# Patient Record
Sex: FEMALE | Race: WHITE | NOT HISPANIC OR LATINO | Employment: STUDENT | URBAN - METROPOLITAN AREA
[De-identification: names, ages, dates, MRNs, and addresses within clinical notes are randomized per-mention and may not be internally consistent; named-entity substitution may affect disease eponyms.]

---
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
## 2024-05-12 NOTE — PROGRESS NOTES
"OBSTETRICS AND GYNECOLOGY    Chief Complaint:  Well Woman Exam     HPI:      Ophelia Reese is a 18 y.o.  who presents today for well woman exam.    LMP: No LMP recorded (lmp unknown). Specifically, patient denies abnormal vaginal bleeding, abnormal discharge/odor, pelvic pain, or dysuria/hematuria. Discontinued OCPs last fall. Walker did not need. Menses usually once a month.   This menses has been late this month. No dysmenorrhea. No menorrhagia. Ms. Reese is not currently sexually active. She declines STD screening today. She denies additional issues, problems, or complaints.     Gardasil: Completed   Ms. Reese confirms that she wears her seatbelt when riding in the car.     OB History          0    Para   0    Term   0       0    AB   0    Living   0         SAB   0    IAB   0    Ectopic   0    Multiple   0    Live Births   0               ROS:     GENERAL: Feeling well overall.   BREASTS: Denies breast skin changes, lumps or nipple discharge.   URINARY: Denies dysuria, hematuria.    Physical Exam:      PHYSICAL EXAM:  /64   Ht 5' 5" (1.651 m)   Wt 55.7 kg (122 lb 12.7 oz)   LMP  (LMP Unknown)   BMI 20.43 kg/m²   Body mass index is 20.43 kg/m².     APPEARANCE:  Well nourished, well developed, in no acute distress.  Able to smile appropriately during our encounter. Makes eye contact. Pleasant.  PSYCH: Appropriate mood and affect.  SKIN:   No acne or hirsutism.  CARDIOVASCULAR:  No edema of peripheral extremities. Well perfused throughout.  RESP:  No accessory muscle use to breathe. Speaking comfortably in complete sentences.   ABDOMEN:  Soft. Nonacute.      Assessment/Plan:     Well Woman Exam  -- Counseled patient regarding healthy diet and regular exercise, daily seat belt use.   -- BP normotensive  -- She denies abuse and feels safe at home.   -- Pap smear:   rec initiate age 20  -- STD screening:  declines   -- UPT  -- Contraception:  declines today  Aware of " pregnancy possibility if to become sexually active  -- To let us know if irregular menses pattern  -- Reviewed condoms for STD prevention  -- Patient of Dr. Jacobo     Follow up in about 1 year (around 5/30/2025) for WWE.    Counseling:     Patient was counseled today on current ASCCP pap guidelines, the recommendation for yearly physical exams, safe driving habits, and breast self awareness. She is to see her PCP for other health maintenance.     Use of the [facility] Patient Portal discussed and encouraged during today's visit.                 As of April 1, 2021, the Cures Act has been passed nationally. This new law requires that all doctors progress notes, lab results, pathology reports and radiology reports be released IMMEDIATELY to the patient in the patient portal. That means that the results are released to you at the EXACT same time they are released to me. Therefore, with all of the patients that I have I am not able to reply to each patient exactly when the results come in. So there will be a delay from when you see the results to when I see them and have time to come up with a response to send you. Also I only see these results when I am on the computer at work. So if the results come in over the weekend or after 5 pm of a work day, I will not see them until the next business day. As you can tell, this is a challenge as a physician to give every patient the quick response they hope for and deserve. So please be patient!   Thanks for your understanding and patience.

## 2024-05-30 ENCOUNTER — OFFICE VISIT (OUTPATIENT)
Dept: OBSTETRICS AND GYNECOLOGY | Facility: CLINIC | Age: 19
End: 2024-05-30
Payer: COMMERCIAL

## 2024-05-30 VITALS
BODY MASS INDEX: 20.46 KG/M2 | WEIGHT: 122.81 LBS | DIASTOLIC BLOOD PRESSURE: 64 MMHG | SYSTOLIC BLOOD PRESSURE: 119 MMHG | HEIGHT: 65 IN

## 2024-05-30 DIAGNOSIS — Z01.419 ENCOUNTER FOR WELL WOMAN EXAM: Primary | ICD-10-CM

## 2024-05-30 PROCEDURE — 3074F SYST BP LT 130 MM HG: CPT | Mod: CPTII,S$GLB,, | Performed by: STUDENT IN AN ORGANIZED HEALTH CARE EDUCATION/TRAINING PROGRAM

## 2024-05-30 PROCEDURE — 3008F BODY MASS INDEX DOCD: CPT | Mod: CPTII,S$GLB,, | Performed by: STUDENT IN AN ORGANIZED HEALTH CARE EDUCATION/TRAINING PROGRAM

## 2024-05-30 PROCEDURE — 3078F DIAST BP <80 MM HG: CPT | Mod: CPTII,S$GLB,, | Performed by: STUDENT IN AN ORGANIZED HEALTH CARE EDUCATION/TRAINING PROGRAM

## 2024-05-30 PROCEDURE — 1159F MED LIST DOCD IN RCRD: CPT | Mod: CPTII,S$GLB,, | Performed by: STUDENT IN AN ORGANIZED HEALTH CARE EDUCATION/TRAINING PROGRAM

## 2024-05-30 PROCEDURE — 99395 PREV VISIT EST AGE 18-39: CPT | Mod: S$GLB,,, | Performed by: STUDENT IN AN ORGANIZED HEALTH CARE EDUCATION/TRAINING PROGRAM

## 2024-05-30 PROCEDURE — 99999 PR PBB SHADOW E&M-EST. PATIENT-LVL III: CPT | Mod: PBBFAC,,, | Performed by: STUDENT IN AN ORGANIZED HEALTH CARE EDUCATION/TRAINING PROGRAM

## 2025-02-07 DIAGNOSIS — Z71.3 NUTRITIONAL COUNSELING: Primary | ICD-10-CM

## 2025-03-25 ENCOUNTER — NUTRITION (OUTPATIENT)
Dept: NUTRITION | Facility: CLINIC | Age: 20
End: 2025-03-25
Payer: COMMERCIAL

## 2025-03-25 VITALS — WEIGHT: 123 LBS | BODY MASS INDEX: 20.49 KG/M2 | HEIGHT: 65 IN

## 2025-03-25 DIAGNOSIS — Z71.3 NUTRITIONAL COUNSELING: ICD-10-CM

## 2025-03-25 PROCEDURE — 99999 PR PBB SHADOW E&M-EST. PATIENT-LVL II: CPT | Mod: PBBFAC,,, | Performed by: DIETITIAN, REGISTERED

## 2025-03-25 PROCEDURE — 97802 MEDICAL NUTRITION INDIV IN: CPT | Mod: S$GLB,,, | Performed by: DIETITIAN, REGISTERED

## 2025-03-25 NOTE — PROGRESS NOTES
"Nutrition Assessment    Visit Type: Insurance initial  Session Time:  1 Hour 30 Minutes  Reason for MNT visit: Pt in for education and nutrition counseling regarding  GI issues.  .       Age: 19 y.o.  Wt:   Wt Readings from Last 1 Encounters:   03/25/25 55.8 kg (123 lb)     Ht:   Ht Readings from Last 1 Encounters:   03/25/25 5' 5" (1.651 m)     BMI:   BMI Readings from Last 1 Encounters:   03/25/25 20.47 kg/m²       Client states:    Ophelia has had issues with GI for a while; gas cramps, indigestion, had bad constipation and started putting castor oil in her belly button and it has helped a lot. She does this daily and it helps with no negative side effects. Ophelia says that gas pain / discomfort are still continuing. She went to a GI Dr, they put her on linzess, which was not helpful and had uncomfortable side effects. Ophelia has had issues with GI since she was a child, pain was so bad once in high school that she fainted.She has some nausea at times, no vomiting, no diarrhea. She limits dairy over all. 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     Medical History      Past Medical History:   Diagnosis Date    Allergy     Eczema        Past Surgical History:   Procedure Laterality Date    COLONOSCOPY N/A 3/3/2023    Procedure: COLONOSCOPY;  Surgeon: Tiffanie Carrasco MD;  Location: [facility] ([address] [place] [address]);  Service: Endoscopy;  Laterality: N/A;  1st available per pt-inst portal-suprep-tb    ESOPHAGOGASTRODUODENOSCOPY N/A 3/3/2023    Procedure: EGD (ESOPHAGOGASTRODUODENOSCOPY);  Surgeon: Tiffanie Carrasco MD;  Location: [address]);  Service: Endoscopy;  Laterality: N/A;  2-27-23- preop call - voice mail on- no message left- [facility]          Medications    Prior to Admission medications    Medication Sig Start Date End Date Taking? Authorizing Provider   dapsone (ACZONE) 7.5 % GlwP  7/19/23   Provider, Historical   tretinoin (RETIN-A) 0.1 % cream  7/19/23   Provider, Historical        Vitamins, Minerals, and/or Supplements:  " ashwaganda  (not daily)    Food Allergies or Intolerances:   some issues with dairy; GI discomfort and skin reaction have occurred     Social History    Marital status:  Single  Lives with 3 other roommates, sharing kitchen (at school)    Social History     Tobacco Use    Smoking status: Never    Smokeless tobacco: Never   Substance Use Topics    Alcohol use: Not Currently     Current Alcohol use: on weekends, 6 drinks approx Friday-sunday    Lab Reports:  Reviewed and noted     Lab Results   Component Value Date    CRP 4.7 12/06/2022         BP Readings from Last 1 Encounters:   05/30/24 119/64        24-hour Recall:   9 am: wake up, nauseas  Work out 9:30 a - 10:00 pm    11 am : Matcha + almond milk  B: 2 eggs, 1 english muffin (not daily), usually no food  *no drinking Matcha on empty stomach: oatmeal + protein powder OR yogurt (non dairy) + nuts, fruit OR protein/fiber rich toast w/ nut butter + jelly + fruit    12- 1 pm: turkey burger (onion, pickle, ground turkey, ketchup, brioche bun) or ground turkey pasta + broccoli / steak + rice w/ vegetables, sweet potatoes )     3 pm: Matcha + almond milk  *snack: add protein powder to Matcha drink  / trail mix / fresh fruit + PB, socorro crackers - don't drink Matcha on empty stomach    6-7 pm Dinner: steak (x2 week) or ground turkey pasta / occasional chicken nuggets / sweet potato/ pasta / rice / zuchinni/squash/cucumbers/carrots/brussel sprouts/cauliflower / likes tuna, salmon / maybe black beans & corn if taco bowl meal  Salad: caesar with chicken or steak (dairy free dressing), avocado   Fruit: grapes, strawberries, apples    Typical week: steak 2 x/ and ground turkey 2x /day for 3 days, 2 days ground turkey 1x (when she has steak the other meal)  *swap out 1 steak meal and 2 turkey meals for meat-less meals 3 x/ week: increase beans, vegetables, fiber + protein rich grains  *assess portion size of turkey and steak you are consuming, aim for 4-5 oz per meal    8-9  pm Snack: wheat square cereal w/ almond milk / PB o apple / cashews salted (not daily)  *try small portion of cottage cheese + berries OR coconut or soy-based yogurt    Beverages: water, liquid IV occasionally, green tea, night time tea, occasional gatorade    Bed around 10 pm    *shops at [employer] (like whole foods) +  [employer]    Food choices (After Midnight)  Patient eating midnight to 4am: (Patient-Rptd) (P) Never                  Food choices (Early Morning)  Patient eats 4am to 8am: (Patient-Rptd) (P) Never                  Food choices (Morning)  Patient eats 8am to noon: (Patient-Rptd) (P) Never                 Food choices (Afternoon)  Patient eats noon to 4pm: (Patient-Rptd) (P) Every day   Home cooked meals (Noon - 4pm): (Patient-Rptd) (P) Turkey with rice, chicken with rice         Food choices (Evening)   Patient eats 4pm to 8pm: (Patient-Rptd) (P) Every day   Home cooked meals (4pm - 8pm): (Patient-Rptd) (P) Turkey burger or steak             Food choices (Late evening)  R [facility] PEQ NUTRITION HOW OFTEN EATING LATE EVENING: (Patient-Rptd) (P) Several times a week   Home cooked meals (8pm - midnight): (Patient-Rptd) (P) Same              Beverages:  How much water consumed (per day?): (Patient-Rptd) (P) 10   Cups of milk consumed (per day?): (Patient-Rptd) (P) 0       Cups of  juice consumed (per day?): (Patient-Rptd) (P) 0   Number of supplement shakes (per day?): (Patient-Rptd) (P) 0       Number of cups of coffee (per day?): (Patient-Rptd) (P) 0           Cups of soda consumed (per day?): (Patient-Rptd) (P) 0   Other non-alcoholic beverages consumed (per day?): (Patient-Rptd) (P) 0          LIFESTYLE FACTORS    Dinning out: 1-2 x per month    Meal preparation/shopping: Who does the grocery shopping:: (Patient-Rptd) (P) Me Who prepares the meals: (Patient-Rptd) (P) Me Client    Sleep: fair    Energy Level: some days very fatigued, other days more energy    Stress Level: low    Support System:   parents    Exercise Regimen: Moderately active (moderate intensity, 3-5 days a week)  [employer] classes 15 min (1-2 x) , 3 x /week  Walk 4 mile 2 x/ week    Diagnosis    Food and nutrition related knowledge deficit related to no prior education on balanced nutrition intake recommendations for GI health as evidenced by diet recall, discussion.      Intervention    Estimated Energy Requirements:   Calories: 1700 kcal   Protein: 100-120 grams  Carbohydrates: 130 - 150 grams  Total Fat: 75-85 grams  Baseline for fluids: 60 fl oz    Recommendations & Goals:  Patient goals and recommendations are tailored to the specific patient's needs, readiness to change, lifestyle, culture, skills, resources, & abilities. Strategies to help achieve these nutrition-related goals were discussed which can include but are not limited to SMART goal setting & mindful eating.     Aim for a minimum of 7 hours sleep   Exercise 60 minutes most days  Eat breakfast within 1-2 hours of waking up  Try not to skip any meals or snacks, not going more than 3-4 hours without eating   At each meal and snack, try to include a source of fiber + lean protein + healthy fat     Written Materials Provided  These resources are intended to assist the patient in making it easier to choose recommended options when eating out & to identify better-for-you brands at the grocery store:    Meal Planning Guide with recommendations discussed along with portion sizes and a customized meal plan   Fueling Well On-the-Go Food Guide  Eat Fit Shopping Guide  Lifestyle Nutrition Meal Guide  RD contact information    Goals:   - fuel body more often, every 3-4 hours with combination of whole grains + lean protein  - eat balanced snack / meal with Matcha tea drink      Monitoring/Evaluation    Monitor the following:  Weight  Sleep  Stress Management  Movement  Nutrient intake in reference to meal plan    Communicated with healthcare provider and documented plan for referral to  appropriate agency/healthcare provider as needed    Supervising Physician: Karan Joel MD    Patient motivation, anticipated barriers, expected compliance: Patient is motivated and has verbalized understanding and intent to comply.     Comprehension: good     Follow-up: 4-6 weeks

## 2025-03-25 NOTE — PATIENT INSTRUCTIONS
Name: Ophelia Reese   Date: 03/25/2025    Recommended daily energy requirements to reach your goals:  Calories: 1700 kcal   Protein: 100-120 grams  Carbohydrates: 130 - 150 grams  Total Fat: 75-85 grams  Baseline for fluids: 60 fl oz    Recommendations & Goals:  Patient goals and recommendations are tailored to the specific patient's needs, readiness to change, lifestyle, culture, skills, resources, & abilities. Strategies to help achieve these nutrition-related goals were discussed which can include but are not limited to SMART goal setting & mindful eating.     Aim for a minimum of 7 hours sleep   Exercise 60 minutes most days  Eat breakfast within 1-2 hours of waking up  Try not to skip any meals or snacks, not going more than 3-4 hours without eating   At each meal and snack, try to include a source of fiber + lean protein + healthy fat     Written Materials Provided    Meal Planning Guide with recommendations discussed along with portion sizes and a customized meal plan   Fueling Well On-the-Go Food Guide  Eat Fit Shopping Guide  Lifestyle Nutrition Meal Guide  RD contact information    Goals:   - fuel body more often, every 3-4 hours with combination of whole grains + lean protein  - eat balanced snack / meal with Matcha tea drink

## 2025-08-20 ENCOUNTER — TELEPHONE (OUTPATIENT)
Dept: OBSTETRICS AND GYNECOLOGY | Facility: CLINIC | Age: 20
End: 2025-08-20
Payer: COMMERCIAL

## 2025-08-28 ENCOUNTER — OFFICE VISIT (OUTPATIENT)
Dept: OBSTETRICS AND GYNECOLOGY | Facility: CLINIC | Age: 20
End: 2025-08-28
Payer: COMMERCIAL

## 2025-08-28 VITALS
DIASTOLIC BLOOD PRESSURE: 60 MMHG | SYSTOLIC BLOOD PRESSURE: 102 MMHG | BODY MASS INDEX: 19.92 KG/M2 | WEIGHT: 119.69 LBS

## 2025-08-28 DIAGNOSIS — Z30.09 ENCOUNTER FOR OTHER GENERAL COUNSELING AND ADVICE ON CONTRACEPTION: Primary | ICD-10-CM

## 2025-08-28 DIAGNOSIS — Z11.3 SCREENING FOR STD (SEXUALLY TRANSMITTED DISEASE): ICD-10-CM

## 2025-08-28 PROCEDURE — 99999 PR PBB SHADOW E&M-EST. PATIENT-LVL III: CPT | Mod: PBBFAC,,, | Performed by: NURSE PRACTITIONER

## 2025-09-02 ENCOUNTER — TELEPHONE (OUTPATIENT)
Dept: OBSTETRICS AND GYNECOLOGY | Facility: CLINIC | Age: 20
End: 2025-09-02
Payer: COMMERCIAL

## 2025-09-02 ENCOUNTER — PATIENT MESSAGE (OUTPATIENT)
Dept: OBSTETRICS AND GYNECOLOGY | Facility: CLINIC | Age: 20
End: 2025-09-02
Payer: COMMERCIAL

## 2025-09-02 DIAGNOSIS — Z30.430 ENCOUNTER FOR IUD INSERTION: Primary | ICD-10-CM

## 2025-09-02 RX ORDER — KETOROLAC TROMETHAMINE 10 MG/1
TABLET, FILM COATED ORAL
Qty: 5 TABLET | Refills: 0 | Status: SHIPPED | OUTPATIENT
Start: 2025-09-02

## 2025-09-04 ENCOUNTER — TELEPHONE (OUTPATIENT)
Dept: OBSTETRICS AND GYNECOLOGY | Facility: CLINIC | Age: 20
End: 2025-09-04
Payer: COMMERCIAL